# Patient Record
Sex: FEMALE | Race: WHITE | NOT HISPANIC OR LATINO | Employment: UNEMPLOYED | URBAN - METROPOLITAN AREA
[De-identification: names, ages, dates, MRNs, and addresses within clinical notes are randomized per-mention and may not be internally consistent; named-entity substitution may affect disease eponyms.]

---

## 2017-03-13 ENCOUNTER — HOSPITAL ENCOUNTER (EMERGENCY)
Facility: HOSPITAL | Age: 37
Discharge: HOME | End: 2017-03-13
Attending: SURGERY
Payer: MEDICAID

## 2017-03-13 VITALS
TEMPERATURE: 98 F | RESPIRATION RATE: 20 BRPM | DIASTOLIC BLOOD PRESSURE: 75 MMHG | BODY MASS INDEX: 31.01 KG/M2 | OXYGEN SATURATION: 99 % | WEIGHT: 175 LBS | HEART RATE: 76 BPM | HEIGHT: 63 IN | SYSTOLIC BLOOD PRESSURE: 126 MMHG

## 2017-03-13 DIAGNOSIS — N30.10 INTERSTITIAL CYSTITIS: Primary | ICD-10-CM

## 2017-03-13 LAB
ALBUMIN SERPL BCP-MCNC: 4.1 G/DL
ALP SERPL-CCNC: 87 U/L
ALT SERPL W/O P-5'-P-CCNC: 42 U/L
ANION GAP SERPL CALC-SCNC: 7 MMOL/L
AST SERPL-CCNC: 23 U/L
B-HCG UR QL: NEGATIVE
BASOPHILS # BLD AUTO: 0.04 K/UL
BASOPHILS NFR BLD: 0.6 %
BILIRUB SERPL-MCNC: 0.1 MG/DL
BILIRUB UR QL STRIP: NEGATIVE
BUN SERPL-MCNC: 6 MG/DL
CALCIUM SERPL-MCNC: 9.9 MG/DL
CHLORIDE SERPL-SCNC: 105 MMOL/L
CLARITY UR: CLEAR
CO2 SERPL-SCNC: 28 MMOL/L
COLOR UR: YELLOW
CREAT SERPL-MCNC: 0.8 MG/DL
DIFFERENTIAL METHOD: ABNORMAL
EOSINOPHIL # BLD AUTO: 0.1 K/UL
EOSINOPHIL NFR BLD: 1.8 %
ERYTHROCYTE [DISTWIDTH] IN BLOOD BY AUTOMATED COUNT: 12.7 %
EST. GFR  (AFRICAN AMERICAN): >60 ML/MIN/1.73 M^2
EST. GFR  (NON AFRICAN AMERICAN): >60 ML/MIN/1.73 M^2
GLUCOSE SERPL-MCNC: 86 MG/DL
GLUCOSE UR QL STRIP: NEGATIVE
HCT VFR BLD AUTO: 37.7 %
HGB BLD-MCNC: 12.7 G/DL
HGB UR QL STRIP: ABNORMAL
KETONES UR QL STRIP: NEGATIVE
LEUKOCYTE ESTERASE UR QL STRIP: NEGATIVE
LYMPHOCYTES # BLD AUTO: 2.6 K/UL
LYMPHOCYTES NFR BLD: 38.9 %
MCH RBC QN AUTO: 33.2 PG
MCHC RBC AUTO-ENTMCNC: 33.7 %
MCV RBC AUTO: 98 FL
MONOCYTES # BLD AUTO: 0.3 K/UL
MONOCYTES NFR BLD: 4.6 %
NEUTROPHILS # BLD AUTO: 3.7 K/UL
NEUTROPHILS NFR BLD: 54.1 %
NITRITE UR QL STRIP: NEGATIVE
PH UR STRIP: 6 [PH] (ref 5–8)
PLATELET # BLD AUTO: 221 K/UL
PMV BLD AUTO: 10.8 FL
POTASSIUM SERPL-SCNC: 4.5 MMOL/L
PROT SERPL-MCNC: 7.5 G/DL
PROT UR QL STRIP: NEGATIVE
RBC # BLD AUTO: 3.83 M/UL
SODIUM SERPL-SCNC: 140 MMOL/L
SP GR UR STRIP: <=1.005 (ref 1–1.03)
URN SPEC COLLECT METH UR: ABNORMAL
UROBILINOGEN UR STRIP-ACNC: NEGATIVE EU/DL
WBC # BLD AUTO: 6.76 K/UL

## 2017-03-13 PROCEDURE — 81025 URINE PREGNANCY TEST: CPT

## 2017-03-13 PROCEDURE — 80053 COMPREHEN METABOLIC PANEL: CPT

## 2017-03-13 PROCEDURE — 36415 COLL VENOUS BLD VENIPUNCTURE: CPT

## 2017-03-13 PROCEDURE — 25000003 PHARM REV CODE 250: Performed by: FAMILY MEDICINE

## 2017-03-13 PROCEDURE — 99283 EMERGENCY DEPT VISIT LOW MDM: CPT

## 2017-03-13 PROCEDURE — 81003 URINALYSIS AUTO W/O SCOPE: CPT

## 2017-03-13 PROCEDURE — 85025 COMPLETE CBC W/AUTO DIFF WBC: CPT

## 2017-03-13 RX ORDER — HYOSCYAMINE SULFATE 0.125 MG
125 TABLET ORAL EVERY 4 HOURS PRN
Qty: 15 TABLET | Refills: 0 | Status: SHIPPED | OUTPATIENT
Start: 2017-03-13 | End: 2017-06-09

## 2017-03-13 RX ORDER — PHENAZOPYRIDINE HYDROCHLORIDE 200 MG/1
200 TABLET, FILM COATED ORAL
Status: COMPLETED | OUTPATIENT
Start: 2017-03-13 | End: 2017-03-13

## 2017-03-13 RX ORDER — PHENAZOPYRIDINE HYDROCHLORIDE 200 MG/1
200 TABLET, FILM COATED ORAL 3 TIMES DAILY
Qty: 20 TABLET | Refills: 0 | Status: SHIPPED | OUTPATIENT
Start: 2017-03-13 | End: 2017-03-23

## 2017-03-13 RX ADMIN — PHENAZOPYRIDINE HYDROCHLORIDE 200 MG: 200 TABLET ORAL at 07:03

## 2017-03-13 NOTE — ED TRIAGE NOTES
Patient presents to the ER with c/o pelvic pain.  Patient reports that she is having a flare up of her interstitial cystitis.  Patient reports pain is in bladder area and also genital area.

## 2017-03-13 NOTE — ED PROVIDER NOTES
Encounter Date: 3/13/2017       History     Chief Complaint   Patient presents with    Pelvic Pain     Review of patient's allergies indicates:   Allergen Reactions    Phenergan [promethazine] Anxiety     Jerking of arms and legs     Patient is a 36 y.o. female presenting with the following complaint: abdominal pain. The history is provided by the patient. No  was used.   Abdominal Pain   The current episode started yesterday. The onset of the illness was gradual. The abdominal pain is located in the suprapubic region. The abdominal pain does not radiate. The severity of the abdominal pain is 6/10. The abdominal pain is relieved by nothing. The other symptoms of the illness do not include fever, jaundice, melena, nausea, vomiting, diarrhea, dysuria, hematemesis, hematochezia, vaginal discharge or vaginal bleeding.   The patient states that she believes she is currently not pregnant. The patient has not had a change in bowel habit. Symptoms associated with the illness do not include chills, diaphoresis, constipation, hematuria or back pain.     Patient has a history of interstitial cystitis.  She says this is a flare of her pain in her bladder and pelvis.  Pain got worse last night and today.  She says she goes to another hospital normally.  No fever chills dysuria urgency or frequency.  She is waiting her appointment with urology with her interstitial cystitis.  Past Medical History:   Diagnosis Date    Anxiety     Depression     Interstitial cystitis     Thyroid disease      Past Surgical History:   Procedure Laterality Date     SECTION      FACIAL RECONSTRUCTION SURGERY      HYSTERECTOMY      PARTIAL HYSTERECTOMY      TONSILLECTOMY      WISDOM TOOTH EXTRACTION       Family History   Problem Relation Age of Onset    Diabetes Father     Hyperlipidemia Mother     Cancer Neg Hx      Social History   Substance Use Topics    Smoking status: Current Every Day Smoker      Packs/day: 0.50     Years: 15.00     Types: Cigarettes    Smokeless tobacco: Never Used    Alcohol use No     Review of Systems   Constitutional: Negative for chills, diaphoresis and fever.   Gastrointestinal: Positive for abdominal pain. Negative for constipation, diarrhea, hematemesis, hematochezia, jaundice, melena, nausea and vomiting.   Genitourinary: Negative for dysuria, hematuria, vaginal bleeding and vaginal discharge.   Musculoskeletal: Negative for back pain.       Physical Exam   Initial Vitals   BP Pulse Resp Temp SpO2   03/13/17 1709 03/13/17 1709 03/13/17 1709 03/13/17 1709 03/13/17 1709   117/62 79 18 98 °F (36.7 °C) 99 %     Physical Exam    Nursing note and vitals reviewed.  Constitutional: She appears well-developed and well-nourished.   Obese female in no acute distress tearful.   HENT:   Head: Normocephalic and atraumatic.   Right Ear: External ear normal.   Left Ear: External ear normal.   Nose: Nose normal.   Mouth/Throat: Oropharynx is clear and moist.   Eyes: Conjunctivae and EOM are normal. Pupils are equal, round, and reactive to light.   Neck: Normal range of motion. Neck supple.   Cardiovascular: Normal rate, regular rhythm and normal heart sounds.   Pulmonary/Chest: Breath sounds normal.   Abdominal: Soft. Bowel sounds are normal.   Musculoskeletal: Normal range of motion.   Neurological: She is alert and oriented to person, place, and time. She has normal strength.   Skin: Skin is warm and dry.   Psychiatric: She has a normal mood and affect.         ED Course   Procedures  Labs Reviewed   CBC W/ AUTO DIFFERENTIAL - Abnormal; Notable for the following:        Result Value    RBC 3.83 (*)     MCH 33.2 (*)     All other components within normal limits   URINALYSIS - Abnormal; Notable for the following:     Specific Gravity, UA <=1.005 (*)     Occult Blood UA Trace (*)     All other components within normal limits   PREGNANCY TEST, URINE RAPID   COMPREHENSIVE METABOLIC PANEL                                ED Course     Clinical Impression:   The encounter diagnosis was Interstitial cystitis.          Jac Arreguin MD  03/13/17 3699

## 2017-03-13 NOTE — ED AVS SNAPSHOT
[facility] [address] [place] [address]               Bernice Villarreal   3/13/2017  5:11 PM   ED    Description:  Female : 1980   Department:  [facility]           Your Care was Coordinated By:     Provider Role From To    Ricky Mujica MD Attending Provider 17 1702 17 172    Jac Arreguin MD Attending Provider 17 172 --      Reason for Visit     Pelvic Pain           Diagnoses this Visit        Comments    Interstitial cystitis    -  Primary       ED Disposition     ED Disposition Condition Comment    Discharge             To Do List           Follow-up Information     Follow up with Serg Timmons MD. Schedule an appointment as soon as possible for a visit in 1 day.    Specialty:  Family Medicine    Why:  For continued care    Contact information:    [address] [address] [id number] [phone]         These Medications        Disp Refills Start End    phenazopyridine (PYRIDIUM) 200 MG tablet 20 tablet 0 3/13/2017 3/23/2017    Take 1 tablet (200 mg total) by mouth 3 (three) times daily. - Oral    Pharmacy: [facility] Ph #: [phone]       hyoscyamine (ANASPAZ,LEVSIN) 0.125 mg Tab 15 tablet 0 3/13/2017 2017    Take 1 tablet (125 mcg total) by mouth every 4 (four) hours as needed. - Oral    Pharmacy: [place] [id number] - [address] Ph #: [phone]         [facility]     [facility] -  Assistance  Registered nurses in the [facility] provide clinical advisement, health education, appointment booking, and other advisory services.  Call for this free service at [phone].             Medications           Message regarding Medications     Verify the changes and/or additions to your medication regime listed below are the same as discussed with your clinician today.  If any of these changes or  additions are incorrect, please notify your healthcare provider.        START taking these NEW medications        Refills    phenazopyridine (PYRIDIUM) 200 MG tablet 0    Sig: Take 1 tablet (200 mg total) by mouth 3 (three) times daily.    Class: Print    Route: Oral    hyoscyamine (ANASPAZ,LEVSIN) 0.125 mg Tab 0    Sig: Take 1 tablet (125 mcg total) by mouth every 4 (four) hours as needed.    Class: Print    Route: Oral      These medications were administered today        Dose Freq    phenazopyridine tablet 200 mg 200 mg ED 1 Time    Sig: Take 1 tablet (200 mg total) by mouth ED 1 Time.    Class: Normal    Route: Oral           Verify that the below list of medications is an accurate representation of the medications you are currently taking.  If none reported, the list may be blank. If incorrect, please contact your healthcare provider. Carry this list with you in case of emergency.           Current Medications     amitriptyline (ELAVIL) 25 MG tablet Take 25 mg by mouth nightly as needed for Insomnia.    clonazePAM (KLONOPIN) 0.5 MG tablet Take 0.5 mg by mouth 2 (two) times daily.    hydrocodone-acetaminophen 5-325mg (NORCO) 5-325 mg per tablet 1 (maximum 2) tablet(s) every 4 to 6 hours as needed for pain. Do not take on empty stomach. Watch for constipation.    hyoscyamine (ANASPAZ,LEVSIN) 0.125 mg Tab Take 1 tablet (125 mcg total) by mouth every 4 (four) hours as needed.    levothyroxine (SYNTHROID) 75 MCG tablet Take 75 mcg by mouth once daily.    ondansetron (ZOFRAN) 4 MG tablet Take 1 tablet (4 mg total) by mouth every 8 (eight) hours as needed for Nausea.    oxybutynin (DITROPAN) 5 MG Tab Take 5 mg by mouth 3 (three) times daily.    phenazopyridine (PYRIDIUM) 200 MG tablet Take 1 tablet (200 mg total) by mouth 3 (three) times daily.           Clinical Reference Information           Your Vitals Were     BP Pulse Temp Resp Height Weight    117/62 (BP Location: Left arm, Patient Position: Sitting) 79 98 °F  "(36.7 °C) (Oral) 18 5' 3" (1.6 m) 79.4 kg (175 lb)    SpO2 BMI             99% 31 kg/m2         Allergies as of 3/13/2017        Reactions    Phenergan [Promethazine] Anxiety    Jerking of arms and legs      Immunizations Administered on Date of Encounter - 3/13/2017     None      ED Micro, Lab, POCT     Start Ordered       Status Ordering Provider    03/13/17 1714 03/13/17 1713  CBC auto differential  STAT      Final result     03/13/17 1714 03/13/17 1713  Comprehensive metabolic panel  STAT      Final result     03/13/17 1714 03/13/17 1713  Urinalysis  STAT      Final result     03/13/17 1714 03/13/17 1713  Pregnancy, urine rapid  STAT      Final result       ED Imaging Orders     None        Discharge Instructions         Treating Interstitial Cystitis: Lifestyle Changes    Interstitial cystitis (IC) is a type of bladder problem that causes the bladder wall to be tender and easily irritated. This can cause pain and other uncomfortable symptoms. Interstitial cystitis can be treated in many different ways. This includes making certain lifestyle changes to help manage symptoms. Following are some common lifestyle changes that may be included as part of your treatment.  Avoiding certain foods  Your health care provider may advise you to avoid certain foods that can worsen your symptoms. These include alcohol, spicy food, chocolate, and caffeine. These can also include citrus fruits and juices, tomatoes, and carbonated drinks. Start by cutting certain foods out of your diet for a few weeks. Then, add the foods back into your diet. See whether this has any effect on your symptoms.  Retraining your bladder  Your provider may also recommend bladder retraining. This involves holding urine in for longer and longer periods. The goal is to stretch the bladder and increase the amount the bladder can hold.  Managing stress  In addition, your provider may teach you various techniques to help manage the stress in your life. " Stress does not cause interstitial cystitis, but it can make your symptoms worse. Meditation, massage, and yoga are some good ways to relax and relieve stress. Exercise can help relieve stress as well. You may want to start with walking or swimming. These activities are less likely to cause symptoms and may be easier for you to do regularly. Another important lifestyle change that your doctor may prescribe is the use of pelvic myofascial exercises.  Date Last Reviewed: 9/17/2014 © 2000-2016 [employer]. [address] [place] [address], [place] [address]. All rights reserved. This information is not intended as a substitute for professional medical care. Always follow your healthcare professional's instructions.          Treating Interstitial Cystitis: Medication    Interstitial cystitis is a painful condition of the bladder. People with interstitial cystitis have a bladder wall that is tender and easily irritated. This leads to uncomfortable symptoms. Interstitial cystitis is chronic (ongoing). This means it has no cure. But it can be managed to help you feel better. Many types of treatment can help manage symptoms and pain. Some may work well for one person and not for another. More than one treatment may be tried so your healthcare provider can find what works best for you.  Oral medications  Your health care provider may give you one or more of these. Ask your health care provider about all of your options.  · Pain medications. These may be used for a short time to help ease discomfort.  · Antispasmodic medications. These may help relax the bladder muscles. This may decrease the need to urinate.  · Nonsteroidal anti-inflammatory drugs (NSAIDs). These may help reduce inflammation and ease pain.  · Antihistamines. These may help reduce inflammation and ease pain.  · Antidepressants. In low doses, these may block pain and help ease symptoms.  · Pentosan polysulfate sodium (Elmiron) and similar medications.  These can restore the bladder lining.  Bladder instillation  Bladder instillation is also called bladder wash or bath. It may help relieve your inflammation. It may help repair the bladders lining. During the treatment, the bladder is filled with liquid medication. This is done with a thin tube (catheter). One or more types of medication may be used. You will hold the medication in your bladder for 15 to 30 minutes. Then you will urinate. Or the medication is drained out through the catheter. This treatment is repeated over 2 to 3 months. In some cases, they may be done at home.  Bladder hydrodistention  Hydrodistention is a procedure to stretch the walls of the bladder. It is done by filling the bladder with fluid. You may have had this procedure to diagnose your interstitial cystitis. Some people have relief from symptoms for a time afterward. If this was true for you, your doctor may repeat the procedure as a treatment.  Date Last Reviewed: 9/17/2014  © 5351-3949 [employer]. [address] [place] [address]. All rights reserved. This information is not intended as a substitute for professional medical care. Always follow your healthcare professional's instructions.          Your Scheduled Appointments     Apr 27, 2017  1:30 PM CDT   Established Patient Visit with ERICA Nye - Urology ([facility])    [address] [place] [address] [id number] [phone]              [facility] Sign-Up     Activating your [facility] account is as easy as 1-2-3!     1) Visit [email or web address], select Sign Up Now, enter this activation code and your date of birth, then select Next.  [email or web address]  Expires: 4/27/2017  7:24 PM      2) Create a username and password to use when you visit [facility] in the future and select a security question in case you lose your password and select Next.    3) Enter your e-mail address and click Sign Up!    Additional Information  If you have  questions, please e-mail [email or web address] or call [phone] to talk to our [facility] staff. Remember, [facility] is NOT to be used for urgent needs. For medical emergencies, dial 911.          [facility] complies with applicable Federal civil rights laws and does not discriminate on the basis of race, color, national origin, age, disability, or sex.        Language Assistance Services     ATTENTION: Language assistance services are available, free of charge. Please call [phone].      ATENCIÓN: Si habla español, tiene a gill disposición servicios gratuitos de asistencia lingüística. Llame al [phone].     CHÚ Ý: N?u b?n nói Ti?ng Vi?t, có các d?ch v? h? tr? ngôn ng? mi?n phí dành cho b?n. G?i s? [phone].

## 2017-03-14 NOTE — DISCHARGE INSTRUCTIONS
Treating Interstitial Cystitis: Lifestyle Changes    Interstitial cystitis (IC) is a type of bladder problem that causes the bladder wall to be tender and easily irritated. This can cause pain and other uncomfortable symptoms. Interstitial cystitis can be treated in many different ways. This includes making certain lifestyle changes to help manage symptoms. Following are some common lifestyle changes that may be included as part of your treatment.  Avoiding certain foods  Your health care provider may advise you to avoid certain foods that can worsen your symptoms. These include alcohol, spicy food, chocolate, and caffeine. These can also include citrus fruits and juices, tomatoes, and carbonated drinks. Start by cutting certain foods out of your diet for a few weeks. Then, add the foods back into your diet. See whether this has any effect on your symptoms.  Retraining your bladder  Your provider may also recommend bladder retraining. This involves holding urine in for longer and longer periods. The goal is to stretch the bladder and increase the amount the bladder can hold.  Managing stress  In addition, your provider may teach you various techniques to help manage the stress in your life. Stress does not cause interstitial cystitis, but it can make your symptoms worse. Meditation, massage, and yoga are some good ways to relax and relieve stress. Exercise can help relieve stress as well. You may want to start with walking or swimming. These activities are less likely to cause symptoms and may be easier for you to do regularly. Another important lifestyle change that your doctor may prescribe is the use of pelvic myofascial exercises.  Date Last Reviewed: 9/17/2014  © 4110-8300 [employer]. [address] [place] [address], [place] [address]. All rights reserved. This information is not intended as a substitute for professional medical care. Always follow your healthcare professional's  instructions.          Treating Interstitial Cystitis: Medication    Interstitial cystitis is a painful condition of the bladder. People with interstitial cystitis have a bladder wall that is tender and easily irritated. This leads to uncomfortable symptoms. Interstitial cystitis is chronic (ongoing). This means it has no cure. But it can be managed to help you feel better. Many types of treatment can help manage symptoms and pain. Some may work well for one person and not for another. More than one treatment may be tried so your healthcare provider can find what works best for you.  Oral medications  Your health care provider may give you one or more of these. Ask your health care provider about all of your options.  · Pain medications. These may be used for a short time to help ease discomfort.  · Antispasmodic medications. These may help relax the bladder muscles. This may decrease the need to urinate.  · Nonsteroidal anti-inflammatory drugs (NSAIDs). These may help reduce inflammation and ease pain.  · Antihistamines. These may help reduce inflammation and ease pain.  · Antidepressants. In low doses, these may block pain and help ease symptoms.  · Pentosan polysulfate sodium (Elmiron) and similar medications. These can restore the bladder lining.  Bladder instillation  Bladder instillation is also called bladder wash or bath. It may help relieve your inflammation. It may help repair the bladders lining. During the treatment, the bladder is filled with liquid medication. This is done with a thin tube (catheter). One or more types of medication may be used. You will hold the medication in your bladder for 15 to 30 minutes. Then you will urinate. Or the medication is drained out through the catheter. This treatment is repeated over 2 to 3 months. In some cases, they may be done at home.  Bladder hydrodistention  Hydrodistention is a procedure to stretch the walls of the bladder. It is done by filling the bladder  with fluid. You may have had this procedure to diagnose your interstitial cystitis. Some people have relief from symptoms for a time afterward. If this was true for you, your doctor may repeat the procedure as a treatment.  Date Last Reviewed: 9/17/2014  © 3484-8085 [employer]. [address] [place] [address]. All rights reserved. This information is not intended as a substitute for professional medical care. Always follow your healthcare professional's instructions.

## 2018-06-11 PROBLEM — M54.2 CHRONIC NECK PAIN: Status: ACTIVE | Noted: 2018-06-11

## 2018-06-11 PROBLEM — R74.8 ELEVATED LIVER ENZYMES: Status: ACTIVE | Noted: 2018-06-11

## 2018-06-11 PROBLEM — R11.0 NAUSEA: Status: ACTIVE | Noted: 2018-06-11

## 2018-06-11 PROBLEM — G89.29 CHRONIC NECK PAIN: Status: ACTIVE | Noted: 2018-06-11

## 2018-12-12 ENCOUNTER — OFFICE VISIT (OUTPATIENT)
Dept: URGENT CARE | Facility: CLINIC | Age: 38
End: 2018-12-12
Payer: MEDICAID

## 2018-12-12 VITALS
RESPIRATION RATE: 18 BRPM | SYSTOLIC BLOOD PRESSURE: 112 MMHG | HEIGHT: 63 IN | TEMPERATURE: 98 F | OXYGEN SATURATION: 96 % | BODY MASS INDEX: 29.23 KG/M2 | DIASTOLIC BLOOD PRESSURE: 72 MMHG | WEIGHT: 165 LBS | HEART RATE: 96 BPM

## 2018-12-12 DIAGNOSIS — J02.9 ACUTE PHARYNGITIS, UNSPECIFIED ETIOLOGY: ICD-10-CM

## 2018-12-12 DIAGNOSIS — J02.9 SORE THROAT: ICD-10-CM

## 2018-12-12 DIAGNOSIS — R68.89 FLU-LIKE SYMPTOMS: ICD-10-CM

## 2018-12-12 DIAGNOSIS — J20.9 ACUTE PURULENT BRONCHITIS: Primary | ICD-10-CM

## 2018-12-12 DIAGNOSIS — J01.00 ACUTE MAXILLARY SINUSITIS, RECURRENCE NOT SPECIFIED: ICD-10-CM

## 2018-12-12 LAB
CTP QC/QA: YES
CTP QC/QA: YES
FLUAV AG NPH QL: NEGATIVE
FLUBV AG NPH QL: NEGATIVE
S PYO RRNA THROAT QL PROBE: NEGATIVE

## 2018-12-12 PROCEDURE — 87804 INFLUENZA ASSAY W/OPTIC: CPT | Mod: QW,S$GLB,, | Performed by: NURSE PRACTITIONER

## 2018-12-12 PROCEDURE — 99214 OFFICE O/P EST MOD 30 MIN: CPT | Mod: S$GLB,,, | Performed by: NURSE PRACTITIONER

## 2018-12-12 PROCEDURE — 87880 STREP A ASSAY W/OPTIC: CPT | Mod: QW,S$GLB,, | Performed by: NURSE PRACTITIONER

## 2018-12-12 RX ORDER — PREDNISONE 20 MG/1
20 TABLET ORAL DAILY
Qty: 5 TABLET | Refills: 0 | Status: SHIPPED | OUTPATIENT
Start: 2018-12-12 | End: 2018-12-17

## 2018-12-12 RX ORDER — BENZONATATE 100 MG/1
100 CAPSULE ORAL 3 TIMES DAILY PRN
Qty: 30 CAPSULE | Refills: 0 | Status: SHIPPED | OUTPATIENT
Start: 2018-12-12 | End: 2019-01-07

## 2018-12-12 RX ORDER — LEVOTHYROXINE SODIUM 75 UG/1
75 TABLET ORAL DAILY
COMMUNITY
End: 2019-07-08 | Stop reason: DRUGHIGH

## 2018-12-12 RX ORDER — AZITHROMYCIN 250 MG/1
TABLET, FILM COATED ORAL
Qty: 6 TABLET | Refills: 0 | Status: SHIPPED | OUTPATIENT
Start: 2018-12-12 | End: 2018-12-17

## 2018-12-12 RX ORDER — IBUPROFEN 800 MG/1
TABLET ORAL
Refills: 0 | COMMUNITY
Start: 2018-12-03 | End: 2019-01-06 | Stop reason: ALTCHOICE

## 2018-12-12 RX ORDER — FLUTICASONE PROPIONATE 50 MCG
1 SPRAY, SUSPENSION (ML) NASAL DAILY
Qty: 1 BOTTLE | Refills: 0 | Status: SHIPPED | OUTPATIENT
Start: 2018-12-12 | End: 2019-01-07

## 2018-12-12 RX ORDER — ALBUTEROL SULFATE 90 UG/1
2 AEROSOL, METERED RESPIRATORY (INHALATION) EVERY 6 HOURS PRN
Qty: 18 G | Refills: 0 | Status: SHIPPED | OUTPATIENT
Start: 2018-12-12 | End: 2019-01-07

## 2018-12-12 NOTE — PROGRESS NOTES
"Subjective:       Patient ID: Bernice Villarreal is a 38 y.o. female.    Vitals:  height is 5' 3" (1.6 m) and weight is 74.8 kg (165 lb). Her oral temperature is 97.5 °F (36.4 °C). Her blood pressure is 112/72 and her pulse is 96. Her respiration is 18 and oxygen saturation is 96%.     Chief Complaint: Cough    37 y/o female, with hx of pneumonia with hospitalization, presents with complaints of cough, sore throat and sinus congestion.        Cough   This is a new problem. The current episode started yesterday. The problem has been rapidly worsening. The problem occurs constantly. Associated symptoms include headaches, nasal congestion, postnasal drip and a sore throat. Pertinent negatives include no chest pain, chills, ear congestion, ear pain, eye redness, fever, hemoptysis, myalgias, rash, rhinorrhea, shortness of breath, sweats or wheezing. Nothing aggravates the symptoms. Treatments tried: Sudafed. The treatment provided mild relief. Her past medical history is significant for bronchitis and pneumonia. There is no history of asthma, COPD or emphysema.       Constitution: Negative for chills, sweating, fatigue and fever.   HENT: Positive for congestion, postnasal drip, sinus pressure and sore throat. Negative for ear pain, sinus pain and voice change.    Neck: Negative for painful lymph nodes.   Cardiovascular: Negative for chest pain.   Eyes: Negative for eye redness.   Respiratory: Positive for cough and sputum production. Negative for chest tightness, bloody sputum, COPD, shortness of breath, stridor, wheezing and asthma.    Gastrointestinal: Negative for nausea and vomiting.   Musculoskeletal: Negative for muscle ache.   Skin: Negative for rash.   Allergic/Immunologic: Negative for seasonal allergies and asthma.   Neurological: Positive for headaches.   Hematologic/Lymphatic: Negative for swollen lymph nodes.       Objective:      Physical Exam   Constitutional: She is oriented to person, place, and time. " She appears well-developed and well-nourished. She is cooperative.  Non-toxic appearance. She appears ill. No distress.   HENT:   Head: Normocephalic and atraumatic.   Right Ear: Hearing, external ear and ear canal normal. A middle ear effusion is present.   Left Ear: Hearing, external ear and ear canal normal. A middle ear effusion is present.   Nose: Mucosal edema and rhinorrhea present. No nasal deformity. No epistaxis.   Mouth/Throat: Uvula is midline and mucous membranes are normal. No trismus in the jaw. Normal dentition. No uvula swelling. Posterior oropharyngeal edema and posterior oropharyngeal erythema present.   Eyes: Conjunctivae and lids are normal. No scleral icterus.   Sclera clear bilat   Neck: Trachea normal, full passive range of motion without pain and phonation normal. Neck supple.   Cardiovascular: Normal rate, regular rhythm, normal heart sounds, intact distal pulses and normal pulses.   Pulmonary/Chest: Effort normal. No respiratory distress. She has decreased breath sounds (mild). She has no wheezes. She has no rhonchi.   Abdominal: Soft. Normal appearance and bowel sounds are normal. She exhibits no distension. There is no tenderness.   Musculoskeletal: Normal range of motion. She exhibits no edema or deformity.   Neurological: She is alert and oriented to person, place, and time. She exhibits normal muscle tone. Coordination normal.   Skin: Skin is warm, dry and intact. She is not diaphoretic. No pallor.   Psychiatric: She has a normal mood and affect. Her speech is normal and behavior is normal. Judgment and thought content normal. Cognition and memory are normal.   Nursing note and vitals reviewed.      Assessment:       1. Acute purulent bronchitis    2. Acute maxillary sinusitis, recurrence not specified    3. Acute pharyngitis, unspecified etiology    4. Flu-like symptoms    5. Sore throat        Plan:         Acute purulent bronchitis  -     benzonatate (TESSALON) 100 MG capsule; Take  1 capsule (100 mg total) by mouth 3 (three) times daily as needed for Cough.  Dispense: 30 capsule; Refill: 0  -     predniSONE (DELTASONE) 20 MG tablet; Take 1 tablet (20 mg total) by mouth once daily. for 5 days  Dispense: 5 tablet; Refill: 0  -     azithromycin (Z-PENNY) 250 MG tablet; Take 2 tablets by mouth on day 1; Take 1 tablet by mouth on days 2-5  Dispense: 6 tablet; Refill: 0  -     albuterol (VENTOLIN HFA) 90 mcg/actuation inhaler; Inhale 2 puffs into the lungs every 6 (six) hours as needed for Wheezing. Rescue  Dispense: 18 g; Refill: 0    Acute maxillary sinusitis, recurrence not specified  -     fluticasone (FLONASE) 50 mcg/actuation nasal spray; 1 spray (50 mcg total) by Each Nare route once daily.  Dispense: 1 Bottle; Refill: 0  -     predniSONE (DELTASONE) 20 MG tablet; Take 1 tablet (20 mg total) by mouth once daily. for 5 days  Dispense: 5 tablet; Refill: 0  -     azithromycin (Z-PENNY) 250 MG tablet; Take 2 tablets by mouth on day 1; Take 1 tablet by mouth on days 2-5  Dispense: 6 tablet; Refill: 0    Acute pharyngitis, unspecified etiology  -     predniSONE (DELTASONE) 20 MG tablet; Take 1 tablet (20 mg total) by mouth once daily. for 5 days  Dispense: 5 tablet; Refill: 0    Flu-like symptoms  -     POCT Influenza A/B    Sore throat  -     POCT rapid strep A    Presentation consistent with sinusitis and acute bronchitis.  No evidence of other bacterial infections including pneumonia or meningitis. Patient has hx of pneumonia. Due to type and duration, worsening of symptoms and exam findings, we will treat with antibiotics. Advised patient on supportive therapies. Patient is to followup with primary physician in 3-4 days.  Instructed patient to go to ER or return to Urgent Care for worsening signs or symptoms.

## 2018-12-12 NOTE — PATIENT INSTRUCTIONS
Bronchitis, Antibiotic Treatment (Adult)    Bronchitis is an infection of the air passages (bronchial tubes) in your lungs. It often occurs when you have a cold. This illness is contagious during the first few days and is spread through the air by coughing and sneezing, or by direct contact (touching the sick person and then touching your own eyes, nose, or mouth).  Symptoms of bronchitis include cough with mucus (phlegm) and low-grade fever. Bronchitis usually lasts 7 to 14 days. Mild cases can be treated with simple home remedies. More severe infection is treated with an antibiotic.  Home care  Follow these guidelines when caring for yourself at home:  · If your symptoms are severe, rest at home for the first 2 to 3 days. When you go back to your usual activities, don't let yourself get too tired.  · Do not smoke. Also avoid being exposed to secondhand smoke.  · You may use over-the-counter medicines to control fever or pain, unless another medicine was prescribed. (Note: If you have chronic liver or kidney disease or have ever had a stomach ulcer or gastrointestinal bleeding, talk with your healthcare provider before using these medicines. Also talk to your provider if you are taking medicine to prevent blood clots.) Aspirin should never be given to anyone younger than 18 years of age who is ill with a viral infection or fever. It may cause severe liver or brain damage.  · Your appetite may be poor, so a light diet is fine. Avoid dehydration by drinking 6 to 8 glasses of fluids per day (such as water, soft drinks, sports drinks, juices, tea, or soup). Extra fluids will help loosen secretions in the nose and lungs.  · Over-the-counter cough, cold, and sore-throat medicines will not shorten the length of the illness, but they may be helpful to reduce symptoms. (Note: Do not use decongestants if you have high blood pressure.)  · Finish all antibiotic medicine. Do this even if you are feeling better after only a  few days.  Follow-up care  Follow up with your healthcare provider, or as advised. If you had an X-ray or ECG (electrocardiogram), a specialist will review it. You will be notified of any new findings that may affect your care.  Note: If you are age 65 or older, or if you have a chronic lung disease or condition that affects your immune system, or you smoke, talk to your healthcare provider about having pneumococcal vaccinations and a yearly influenza vaccination (flu shot).  When to seek medical advice  Call your healthcare provider right away if any of these occur:  · Fever of 100.4°F (38°C) or higher  · Coughing up increased amounts of colored sputum  · Weakness, drowsiness, headache, facial pain, ear pain, or a stiff neck  Call 911, or get immediate medical care  Contact emergency services right away if any of these occur.  · Coughing up blood  · Worsening weakness, drowsiness, headache, or stiff neck  · Trouble breathing, wheezing, or pain with breathing  Date Last Reviewed: 9/13/2015 © 2000-2017 [employer]. [address] [place] [address]. All rights reserved. This information is not intended as a substitute for professional medical care. Always follow your healthcare professional's instructions.        Acute Sinusitis    Acute sinusitis is irritation and swelling of the sinuses. It is usually caused by a viral infection after a common cold. Your doctor can help you find relief.  What is acute sinusitis?  Sinuses are air-filled spaces in the skull behind the face. They are kept moist and clean by a lining of mucosa. Things such as pollen, smoke, and chemical fumes can irritate the mucosa. It can then swell up. As a response to irritation, the mucosa makes more mucus and other fluids. Tiny hairlike cilia cover the mucosa. Cilia help carry mucus toward the opening of the sinus. Too much mucus may cause the cilia to stop working. This blocks the sinus opening. A buildup of fluid in the  sinuses then causes pain and pressure. It can also encourage bacteria to grow in the sinuses.  Common symptoms of acute sinusitis  You may have:  · Facial soreness pain  · Headache  · Fever  · Fluid draining in the back of the throat (postnasal drip)  · Congestion  · Drainage that is thick and colored, instead of clear  · Cough  Diagnosing acute sinusitis  Your doctor will ask about your symptoms and health history. He or she will look at your ear, nose, and throat. You usually won't need to have X-rays taken.    The doctor may take a sample of mucus to check for bacteria. If you have sinusitis that keeps coming back, you may need imaging tests such as X-rays or CAT scans. This will help your doctor check for a structural problem that may be causing the infection.  Treating acute sinusitis  Treatment is aimed at unblocking the sinus opening and helping the cilia work again. You may need to take antihistamine and decongestant medicine. These can reduce inflammation and decrease the amount of fluid your sinuses make. If you have a bacterial infection, you will need to take antibiotic medicine for 10 to 14 days. Take this medicine until it is gone, even if you feel better.  Date Last Reviewed: 10/1/2016  © 7304-6988 [employer]. [address] [place] [address], [place] [address]. All rights reserved. This information is not intended as a substitute for professional medical care. Always follow your healthcare professional's instructions.        Self-Care for Sore Throats    Sore throats happen for many reasons, such as colds, allergies, and infections caused by viruses or bacteria. In any case, your throat becomes red and sore. Your goal for self-care is to reduce your discomfort while giving your throat a chance to heal.  Moisten and soothe your throat  Tips include the following:  · Try a sip of water first thing after waking up.  · Keep your throat moist by drinking 6 or more glasses of clear liquids every  day.  · Run a cool-air humidifier in your room overnight.  · Avoid cigarette smoke.   · Suck on throat lozenges, cough drops, hard candy, ice chips, or frozen fruit-juice bars. Use the sugar-free versions if your diet or medical condition requires them.  Gargle to ease irritation  Gargling every hour or 2 can ease irritation. Try gargling with 1 of these solutions:  · 1/4 teaspoon of salt in 1/2 cup of warm water  · An over-the-counter anesthetic gargle  Use medicine for more relief  Over-the-counter medicine can reduce sore throat symptoms. Ask your pharmacist if you have questions about which medicine to use:  · Ease pain with anesthetic sprays. Aspirin or an aspirin substitute also helps. Remember, never give aspirin to anyone 18 or younger, or if you are already taking blood thinners.   · For sore throats caused by allergies, try antihistamines to block the allergic reaction.  · Remember: unless a sore throat is caused by a bacterial infection, antibiotics wont help you.  Prevent future sore throats  Prevention tips include the following:  · Stop smoking or reduce contact with secondhand smoke. Smoke irritates the tender throat lining.  · Limit contact with pets and with allergy-causing substances, such as pollen and mold.  · When youre around someone with a sore throat or cold, wash your hands often to keep viruses or bacteria from spreading.  · Dont strain your vocal cords.  Call your healthcare provider  Contact your healthcare provider if you have:  · A temperature over 101°F (38.3°C)  · White spots on the throat  · Great difficulty swallowing  · Trouble breathing  · A skin rash  · Recent exposure to someone else with strep bacteria  · Severe hoarseness and swollen glands in the neck or jaw   Date Last Reviewed: 8/1/2016  © 0859-7533 [employer]. [address] [place] [address], [place] [address]. All rights reserved. This information is not intended as a substitute for professional medical care.  Always follow your healthcare professional's instructions.      1.  Take all medications as directed. If you have been prescribed antibiotics, make sure to complete them.   2.  Rest and keep yourself/patient well hydrated. For adults, it is recommended to drink at least 8-10 glasses of water daily.   3.  For patients above 6 months of age who are not allergic to and are not on anticoagulants, you can alternate Tylenol and Motrin every 4-6 hours for fever above 100.4F and/or pain.  For patients less than 6 months of age, allergic to or intolerant to NSAIDS, have gastritis, gastric ulcers, or history of GI bleeds, are pregnant, or are on anticoagulant therapy, you can take Tylenol every 4 hours as needed for fever above 100.4F and/or pain.   4. You should schedule a follow-up appointment with your Primary Care Provider/Pediatrician for recheck in 2-3 days or as directed at this visit.   5.  If your condition fails to improve in a timely manner, you should receive another evaluation by your Primary Care Provider/Pediatrician to discuss your concerns or return to urgent care for a recheck.  If your condition worsens at any time, you should report immediately to your nearest Emergency Department for further evaluation. **You must understand that you have received Urgent Care treatment only and that you may be released before all of your medical problems are known or treated. You, the patient, are responsible to arrange for follow-up care as instructed.     Upper Respiratory Infections    The common cold/ viral upper respiratory infection is an acute, self-limiting infection of the upper respiratory tract characterized by variable degrees of sneezing, nasal congestion and discharge (rhinorrhea), sore throat, cough, low grade fever, headache, and malaise.    Symptoms usually peak on day 2 to 3 of illness and then gradually improve over 7 to 14 days.  A cough may linger for 3 to 4 weeks but should steadily improve over  time.     If your condition fails to improve in a timely manner, you should receive another evaluation by your Primary Care Provide to discuss your concerns or return to urgent care for a recheck.  If your condition worsens at any time, you should report immediately to your nearest Emergency Department for further evaluation. **You must understand that you have received Urgent Care treatment only and that you may be released before all of your medical problems are known or treated. You, the patient, are responsible to arrange for follow-up care as instructed.     The following information is provided to help you in treating upper respiratory infections.    Decongestant Nasal Sprays  Over-the-counter decongestant nasal spray such as Afrin, may be helpful as an initial step in treating upper respiratory infections. This spray can be used for up to approximately 3 to 5 days and is used no more than twice per day. Topical nasal decongestant spray for longer than 5 days will result in a physical addiction, in which the nasal lining will become significantly swollen and irritated until the spray is used again.     Nasal Saline  Nasal saline is available over the counter. There are several different commercial preparations such as Ocean spray and Ayr spray. There is no limit on the use of Nasal saline. Saline is used by snorting the mist up into the nose then later gently blowing the nose to get rid of any secretions that it has loosened.    Nasal Steroids  Nasal steroid medications such as Flonase are useful for upper respiratory infections, allergies, and sensitivities to airborne irritants. Unfortunately, they do not begin to work for 1-2 days, and they do not reach their maximum benefit for approximately 2-3 weeks. Initial therapy is typically 2 puffs per nostril twice per day. This should be used for only a few days, then the maintenance dosage is one or two puffs per nostril once per day. This can be done at any  time of the day. The most effective way to use any nasal medication is to look down at your toes when spraying it in. Aim slightly away from the septum (dividing plate between the nostrils), and gently inhale. This ensures that the spray will go into the sinus cavities and not straight up into the nose. A good way to avoid spraying onto the septum is to use the right hand to spray into the left nostril, and vice versa for the right nostril. Occasionally, nasal steroids can increase the risk of nosebleeds, but in general they are very well tolerated and effective medications.    Antihistamines  Antihistamines are available both over-the-counter and as a prescription. There are also various decongestant and antihistamine combinations available such as Claritin, Allegra, and Zyrtec. It is best to take any antihistamine-decongestant combination in the morning to avoid insomnia. Zyrtec should probably be taken at night, in order to reduce the chance of sleepiness during the daytime. If there is a significant infection present and secretions are already thickened, it is recommended to discontinue antihistamines and use a mucous thinner/decongestant combination.    Mucous Thinners and Decongestants  Mucous thinners and decongestants are used to shrink down the tissues and promote sinus drainage. There are multiple prescription and over-the-counter varieties available. A mucous thinner will tend to be drying unless you are also drinking plenty of water when taking these. If you have high blood pressure, it is very important to monitor your pressure while on decongestants. The mucous thinner/decongestant combinations are typically given twice per day. However, some people will be unable to tolerate these at night and should only take them once per day.    Oral Steroids  Oral steroids can be used with more sever infections. Often, they are the only medications that will reduce the symptoms of pressure and allow the nasal  sinuses to drain. These are best taken on a full stomach and earlier in the day is better. They may give you some irritability, stomach upset, or hyperactivity. This can also interfere with sleep. A person who has high blood pressure or diabetes needs to be very careful to monitor their pressure or blood glucose while taking steroids. Steroids can have multiple side effects when taken long-term, but short-term doses are very well tolerated and extremely effective in controlling the symptoms associated with acute and chronic sinus infections, severe allergies, or nasal polyps. The only significant side effect of note with oral steroids is the extraordinarily uncommon occurrence of damage to the hip cartilage, which is very rare and is usually associated with long-term usage of steroids. The use of steroids for greater than approximately seven days requires a tapering down in order to discontinue them. You should not abruptly stop your steroid if you have been taking the same dose for greater than one week.    Antibiotic Treatment  Finally, when all of these other measures have failed, and a bacterial infection is present, an antibiotic will be prescribed. The most common symptoms of acute sinusitis of a bacterial nature are pain, pressure, and thick and colored nasal drainage. However, not all colored drainage means that there is a bacterial infection present. According to the Center for Disease Control, only 2% of colds will progress to result in bacterial sinusitis. Most upper respiratory infections should NOT be treated with antibiotics. Antibiotics should be reserved for upper respiratory infections which last longer than 10 days, or which worsen after 4 or 5 days of treatment. The use of antibiotics for nonbacterial upper respiratory infections has resulted in a severe problem with the emergence of bacteria which are resistant to multiple forms of antibiotics, and some bacteria are currently only treatable with  intravenous antibiotics.    Body Aches/Pains/Fever  For patients who are not allergic to and are not on anticoagulants, you can alternate Tylenol and Motrin every 4-6 hours for fever above 100.4F and/or pain.  For patients who are allergic or intolerant to NSAIDS, have gastritis, gastric ulcers, or history of GI bleeds, are pregnant, or are on anticoagulant therapy, you can take Tylenol every 4 hours as needed for fever above 100.4F and/or pain.     Maintain adequate hydration -  Rest and keep yourself/patient well hydrated. For adults, it is recommended to drink at least 8-10 glasses of water daily.  This may help thin secretions and soothe the respiratory mucosa.     Drink Hot Liquids (coffee, water, tea, hot chocolate or soup). Put liquid in a Mug and place in Microwave for 2 to 3 minutes. CHANGE THE CUP THAT WAS USED IN THE MICROWAVE SO AS NOT TO BURN YOUR MOUTH.  Sniff the steam from the cup and sip the heated liquid TEN TO TWELVE TIMES A DAY for 5 to 7 Days.    COUGH  A viral cough may linger for 3 to 4 weeks but should steadily improve over time.   Coughing is the body's natural way to clear mucus and help get rid of bacteria and viruses. Therefore, cough suppressants are usually not recomended.  Common cough suppressants include syrups with the ingredient dextromethorphan or DM, available over-the-counter, or prescription syrups containing codeine or hydrocone.  There is no proven benefit and have potential harms.       ?Honey may be beneficial, especially on nocturnal cough.   1 to 2 teaspoons can be taken straight or diluted in tea, juice or other liquid.     The antioxidants in honey are an important contributor to its decongestant  properties. Generally speaking, darker honey contains more antioxidants. Buckwheat and avocado honey are particularly good choices. If these honeys are not available in your area, choose the darkest honey you can find.

## 2018-12-15 ENCOUNTER — TELEPHONE (OUTPATIENT)
Dept: URGENT CARE | Facility: CLINIC | Age: 38
End: 2018-12-15

## 2019-06-07 PROBLEM — M54.16 LUMBAR RADICULOPATHY: Status: ACTIVE | Noted: 2019-06-07
